# Patient Record
Sex: MALE | Race: WHITE | Employment: PART TIME | URBAN - METROPOLITAN AREA
[De-identification: names, ages, dates, MRNs, and addresses within clinical notes are randomized per-mention and may not be internally consistent; named-entity substitution may affect disease eponyms.]

---

## 2019-05-28 ENCOUNTER — HOSPITAL ENCOUNTER (OUTPATIENT)
Facility: CLINIC | Age: 59
Discharge: HOME | End: 2019-05-28
Attending: COLON & RECTAL SURGERY | Admitting: COLON & RECTAL SURGERY
Payer: COMMERCIAL

## 2019-05-28 VITALS
OXYGEN SATURATION: 95 % | WEIGHT: 160 LBS | SYSTOLIC BLOOD PRESSURE: 129 MMHG | BODY MASS INDEX: 24.25 KG/M2 | HEART RATE: 73 BPM | HEIGHT: 68 IN | RESPIRATION RATE: 12 BRPM | DIASTOLIC BLOOD PRESSURE: 79 MMHG

## 2019-05-28 LAB — COLONOSCOPY: NORMAL

## 2019-05-28 PROCEDURE — 25000128 H RX IP 250 OP 636: Performed by: COLON & RECTAL SURGERY

## 2019-05-28 PROCEDURE — G0500 MOD SEDAT ENDO SERVICE >5YRS: HCPCS | Performed by: COLON & RECTAL SURGERY

## 2019-05-28 PROCEDURE — 45378 DIAGNOSTIC COLONOSCOPY: CPT | Performed by: COLON & RECTAL SURGERY

## 2019-05-28 PROCEDURE — G0121 COLON CA SCRN NOT HI RSK IND: HCPCS | Performed by: COLON & RECTAL SURGERY

## 2019-05-28 RX ORDER — ONDANSETRON 4 MG/1
4 TABLET, ORALLY DISINTEGRATING ORAL EVERY 6 HOURS PRN
Status: CANCELLED | OUTPATIENT
Start: 2019-05-28

## 2019-05-28 RX ORDER — FLUMAZENIL 0.1 MG/ML
0.2 INJECTION, SOLUTION INTRAVENOUS
Status: CANCELLED | OUTPATIENT
Start: 2019-05-28 | End: 2019-05-28

## 2019-05-28 RX ORDER — LIDOCAINE 40 MG/G
CREAM TOPICAL
Status: DISCONTINUED | OUTPATIENT
Start: 2019-05-28 | End: 2019-05-28 | Stop reason: HOSPADM

## 2019-05-28 RX ORDER — ONDANSETRON 2 MG/ML
4 INJECTION INTRAMUSCULAR; INTRAVENOUS EVERY 6 HOURS PRN
Status: CANCELLED | OUTPATIENT
Start: 2019-05-28

## 2019-05-28 RX ORDER — FENTANYL CITRATE 50 UG/ML
INJECTION, SOLUTION INTRAMUSCULAR; INTRAVENOUS PRN
Status: DISCONTINUED | OUTPATIENT
Start: 2019-05-28 | End: 2019-05-28 | Stop reason: HOSPADM

## 2019-05-28 RX ORDER — ATORVASTATIN CALCIUM 40 MG/1
40 TABLET, FILM COATED ORAL DAILY
COMMUNITY

## 2019-05-28 RX ORDER — ONDANSETRON 2 MG/ML
4 INJECTION INTRAMUSCULAR; INTRAVENOUS
Status: DISCONTINUED | OUTPATIENT
Start: 2019-05-28 | End: 2019-05-28 | Stop reason: HOSPADM

## 2019-05-28 RX ORDER — PROCHLORPERAZINE MALEATE 10 MG
10 TABLET ORAL EVERY 6 HOURS PRN
Status: CANCELLED | OUTPATIENT
Start: 2019-05-28

## 2019-05-28 RX ORDER — NALOXONE HYDROCHLORIDE 0.4 MG/ML
.1-.4 INJECTION, SOLUTION INTRAMUSCULAR; INTRAVENOUS; SUBCUTANEOUS
Status: CANCELLED | OUTPATIENT
Start: 2019-05-28 | End: 2019-05-29

## 2019-05-28 ASSESSMENT — MIFFLIN-ST. JEOR: SCORE: 1520.26

## 2019-05-28 NOTE — H&P
"Pre-Endoscopy History and Physical   Nicholas Olvera MRN# [id number]   YOB: 1960 Age: 59 year old   Date of Procedure: 5/28/2019   Primary care provider: No Ref-Primary, Physician   Type of Endoscopy: colonoscopy   Reason for Procedure: screening   Type of Anesthesia Anticipated: Moderate Sedation   HPI:   Nicholas is a 59 year old male for screening colonoscopy.  He reports having had a colonoscopy 10 years ago that was normal.  He denies BRBPR, abdominal pain, nausea/vomiting, changes in bowel habits or unintentional weight loss. He denies a FH of CRC.    No Known Allergies   Prior to Admission Medications   Prescriptions Last Dose Informant Patient Reported? Taking?   atorvastatin (LIPITOR) 40 MG tablet 5/27/2019 at Unknown time  Yes Yes   Sig: Take 40 mg by mouth daily      Facility-Administered Medications: None      There is no problem list on file for this patient.     Past Medical History:   Diagnosis Date     Hypercholesterolemia       Past Surgical History:   Procedure Laterality Date     SOFT TISSUE SURGERY      Lesion excised from back      Social History     Tobacco Use     Smoking status: Never Smoker     Smokeless tobacco: Never Used   Substance Use Topics     Alcohol use: Yes     Comment: 3-4 drinks per week      Family History   Problem Relation Age of Onset     Myocardial Infarction Father       PHYSICAL EXAM:   BP (!) 137/96   Ht 1.727 m (5' 8\")   Wt 72.6 kg (160 lb)   SpO2 94%   BMI 24.33 kg/m   Estimated body mass index is 24.33 kg/m  as calculated from the following:    Height as of this encounter: 1.727 m (5' 8\").    Weight as of this encounter: 72.6 kg (160 lb).   RESP: lungs clear to auscultation - no rales, rhonchi or wheezes   CV: regular rates and rhythm   ASA Class 2 - Mild systemic disease    Assessment: 60 y/o gentleman for screening colonoscopy    Plan: Colonoscopy with moderate sedation.  Risks of the procedure were discussed including, but not limited to, " bleeding, perforation and missed lesions.  Patient understands and is willing to proceed.    Rony Mccoy MD ....................  5/28/2019   8:18 AM  Colon and Rectal Surgery Staff  [phone]

## 2021-06-30 ENCOUNTER — ANCILLARY PROCEDURE (OUTPATIENT)
Dept: GENERAL RADIOLOGY | Facility: CLINIC | Age: 61
End: 2021-06-30
Attending: PHYSICIAN ASSISTANT
Payer: COMMERCIAL

## 2021-06-30 ENCOUNTER — OFFICE VISIT (OUTPATIENT)
Dept: FAMILY MEDICINE | Facility: CLINIC | Age: 61
End: 2021-06-30
Payer: COMMERCIAL

## 2021-06-30 VITALS
WEIGHT: 183.1 LBS | TEMPERATURE: 96.8 F | HEART RATE: 73 BPM | SYSTOLIC BLOOD PRESSURE: 136 MMHG | BODY MASS INDEX: 27.84 KG/M2 | OXYGEN SATURATION: 96 % | DIASTOLIC BLOOD PRESSURE: 84 MMHG

## 2021-06-30 DIAGNOSIS — I80.02 THROMBOPHLEBITIS OF SUPERFICIAL VEINS OF LEFT LOWER EXTREMITY: ICD-10-CM

## 2021-06-30 DIAGNOSIS — M79.662 PAIN OF LEFT LOWER LEG: ICD-10-CM

## 2021-06-30 DIAGNOSIS — M79.662 PAIN OF LEFT LOWER LEG: Primary | ICD-10-CM

## 2021-06-30 PROCEDURE — 73590 X-RAY EXAM OF LOWER LEG: CPT | Mod: LT | Performed by: INTERNAL MEDICINE

## 2021-06-30 PROCEDURE — 99204 OFFICE O/P NEW MOD 45 MIN: CPT | Performed by: PHYSICIAN ASSISTANT

## 2021-06-30 RX ORDER — IBUPROFEN 600 MG/1
600 TABLET, FILM COATED ORAL EVERY 8 HOURS PRN
Qty: 30 TABLET | Refills: 0 | Status: SHIPPED | OUTPATIENT
Start: 2021-06-30

## 2021-06-30 NOTE — PROGRESS NOTES
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 Developed a lump that resolved but now today noticed a red streak going up his calf and now the pain in the proximal calf  tdap 2016  Believes he has FH DVT in uncle.      Past Medical History:   Diagnosis Date     Hypercholesterolemia      Past Surgical History:   Procedure Laterality Date     COLONOSCOPY N/A 5/28/2019    Procedure: COLONOSCOPY;  Surgeon: Rony Mccoy MD;  Location:  GI     SOFT TISSUE SURGERY      Lesion excised from back     Social History     Tobacco Use     Smoking status: Never Smoker     Smokeless tobacco: Never Used   Substance Use Topics     Alcohol use: Yes     Comment: 3-4 drinks per week     Current Outpatient Medications   Medication Sig Dispense Refill     atorvastatin (LIPITOR) 40 MG tablet Take 40 mg by mouth daily       No Known Allergies      PHYSICAL EXAM:    /84 (BP Location: Right arm, Patient Position: Sitting, Cuff Size: Adult Regular)   Pulse 73   Temp 96.8  F (36  C) (Temporal)   Wt 83.1 kg (183 lb 1.6 oz)   SpO2 96%   BMI 27.84 kg/m      Patient appears non toxic  L calf: tenderness ant distal shin where he was hit  There is a palp cord with overlying erythema medial calf to knee    Assessment and Plan:     (M79.662) Pain of left lower leg  (primary encounter diagnosis)  Comment:   Plan: XR Tibia & Fibula Left 2 Views reviewed by myself and normal            (I80.02) Thrombophlebitis of superficial veins of left lower extremity  Comment: suspect this induced by trauma. Recd avoiding catching for the girls until the leg has a chance to heal. Elevate leg and apply heating pad  Plan: ibuprofen (ADVIL/MOTRIN) 600 MG tablet tid x 10d (take with food). F/u with PCP for recheck. Will need venous ultrasound if any extension into the thigh or persistent " pain or swelling.              Rita Redding PA-C

## 2022-04-08 ENCOUNTER — TELEPHONE (OUTPATIENT)
Dept: PHARMACY | Facility: PHYSICIAN GROUP | Age: 62
End: 2022-04-08
Payer: COMMERCIAL

## (undated) DEVICE — SOL WATER IRRIG 1000ML BOTTLE 2F7114

## (undated) RX ORDER — FENTANYL CITRATE 50 UG/ML
INJECTION, SOLUTION INTRAMUSCULAR; INTRAVENOUS
Status: DISPENSED
Start: 2019-05-28